# Patient Record
Sex: MALE | Race: WHITE | Employment: OTHER | URBAN - NONMETROPOLITAN AREA
[De-identification: names, ages, dates, MRNs, and addresses within clinical notes are randomized per-mention and may not be internally consistent; named-entity substitution may affect disease eponyms.]

---

## 2024-03-18 ENCOUNTER — TELEPHONE (OUTPATIENT)
Dept: FAMILY MEDICINE CLINIC | Facility: CLINIC | Age: 44
End: 2024-03-18

## 2024-03-18 NOTE — TELEPHONE ENCOUNTER
LVM asking for a call back as pt has no emergency contacts and there are other registration items missing.  Pt must call back of insurance card and change pcp as well prior to appt.  Pt is seeing Dr Alcantar who will only be seeing patients in [place] as of the end of March.If he is going to follow her to [place] then he will have to call his insurance to have her listed as PCP  If patient wants to stay with the [facility] then he will have to tell the [facility] insurance that Dr Natalio Hernandez is PCP  You can transfer into office if it's easier for you  TY.